# Patient Record
Sex: MALE | Race: WHITE
[De-identification: names, ages, dates, MRNs, and addresses within clinical notes are randomized per-mention and may not be internally consistent; named-entity substitution may affect disease eponyms.]

---

## 2023-05-02 ENCOUNTER — HOSPITAL ENCOUNTER (EMERGENCY)
Dept: HOSPITAL 12 - ER | Age: 83
Discharge: HOME | End: 2023-05-02
Payer: COMMERCIAL

## 2023-05-02 VITALS — BODY MASS INDEX: 22.73 KG/M2 | WEIGHT: 150 LBS | HEIGHT: 68 IN

## 2023-05-02 VITALS — SYSTOLIC BLOOD PRESSURE: 135 MMHG | DIASTOLIC BLOOD PRESSURE: 67 MMHG
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
## 2023-05-02 DIAGNOSIS — S41.112A: Primary | ICD-10-CM

## 2023-05-02 DIAGNOSIS — Z88.8: ICD-10-CM

## 2023-05-02 DIAGNOSIS — Z79.899: ICD-10-CM
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
## 2023-05-02 PROCEDURE — A4663 DIALYSIS BLOOD PRESSURE CUFF: HCPCS

## 2023-05-02 NOTE — NUR
Patient discharged to [facility] in stable condition with APA. A/O x 2. NAD noted. 
All belongins with patient. Written and verbal after care instructions given. 
Patient verbalizes understanding of instructions. Stressed follow up or return 
to ER for worsening s/s.